# Patient Record
Sex: FEMALE | Race: WHITE
[De-identification: names, ages, dates, MRNs, and addresses within clinical notes are randomized per-mention and may not be internally consistent; named-entity substitution may affect disease eponyms.]

---

## 2017-05-12 ENCOUNTER — HOSPITAL ENCOUNTER (OUTPATIENT)
Dept: HOSPITAL 47 - RADMAMWWP | Age: 77
Discharge: HOME | End: 2017-05-12
Payer: MEDICARE

## 2017-05-12 DIAGNOSIS — R92.8: Primary | ICD-10-CM

## 2017-05-12 NOTE — MM
Reason for exam: follow-up at short interval from prior study.

Last mammogram was performed 6 months ago.



History:

Patient is postmenopausal.

Benign u/S right breast localization of the right breast, December 7, 2011.  9 

excisional biopsies of the left breast.  9 excisional biopsies of the right 

breast.

Took tamoxifen for 5 years beginning at age 62.



Physical Findings:

Nurse did not find any significant physical abnormalities on exam.



MG 3D Diag Mammo W/Cad LT

CC and MLO view(s) were taken of the left breast.

Prior study comparison: November 16, 2016, bilateral MG 3d screening mammo w/cad. 

July 6, 2015, bilateral MG screening mammo w CAD.

The breast tissue is heterogeneously dense. This may lower the sensitivity of 

mammography.

Finding: There are typically benign calcifications in the left breast. There is a 

chronic nodularity in the left breast.

No significant changes in finding since November 16, 2016 and July 6, 2015.



These results were verbally communicated with the patient and result sheet given 

to the patient on 5/12/17.





ASSESSMENT: Benign, BI-RAD 2



RECOMMENDATION:

Return to routine screening mammogram schedule for both breasts.

Back on schedule.

## 2018-01-12 ENCOUNTER — HOSPITAL ENCOUNTER (OUTPATIENT)
Dept: HOSPITAL 47 - RADMAMWWP | Age: 78
Discharge: HOME | End: 2018-01-12
Payer: MEDICARE

## 2018-01-12 DIAGNOSIS — Z12.31: Primary | ICD-10-CM

## 2018-01-12 PROCEDURE — 77063 BREAST TOMOSYNTHESIS BI: CPT

## 2018-01-12 PROCEDURE — 77067 SCR MAMMO BI INCL CAD: CPT

## 2018-01-15 NOTE — MM
Reason for exam: screening  (asymptomatic).

Last mammogram was performed 8 months ago.



History:

Patient is postmenopausal.

Benign u/S right breast localization of the right breast, December 7, 2011.  9 

excisional biopsies of the left breast.  9 excisional biopsies of the right 

breast.

Took tamoxifen for 5 years beginning at age 62.



Physical Findings:

A clinical breast exam by your physician is recommended on an annual basis and 

results should be correlated with mammographic findings.



MG 3D Screening Mammo W/Cad

Bilateral CC and MLO view(s) were taken.

Prior study comparison: May 12, 2017, left breast MG 3d diag mammo w/cad LT.  

November 16, 2016, bilateral MG 3d screening mammo w/cad.

The breast tissue is extremely dense which could obscure a lesion on mammography. 

Stable benign calcifications. There is no discrete abnormality.  No significant 

changes when compared with prior studies.





ASSESSMENT: Benign, BI-RAD 2



RECOMMENDATION:

Routine screening mammogram of both breasts in 1 year.

## 2020-09-25 ENCOUNTER — HOSPITAL ENCOUNTER (OUTPATIENT)
Dept: HOSPITAL 47 - WWCWWP | Age: 80
Discharge: HOME | End: 2020-09-25
Attending: SURGERY
Payer: MEDICARE

## 2020-09-25 VITALS
TEMPERATURE: 98.4 F | HEART RATE: 102 BPM | SYSTOLIC BLOOD PRESSURE: 130 MMHG | RESPIRATION RATE: 18 BRPM | DIASTOLIC BLOOD PRESSURE: 82 MMHG

## 2020-09-25 DIAGNOSIS — Z53.9: Primary | ICD-10-CM

## 2020-09-25 NOTE — P.GSHP
History of Present Illness


H&P Date: 20


Chief Complaint: fibrocystic breast disease





     Laly is a 80-year-old white female seen in consultation for Dr. Carolyne Mansfield who presents for breast evaluation.  Laly does not feel any lumps 

masses or nodules of concern in her breasts.  Her last mammogram was 2 years ago

.  She has had a long history of very fibrocystic breast disease.  She was 

actually on a antiestrogen trial approximately 20 years ago in which after 

starting the medication the lumpiness of her breast resolved.  She was on that 

medication for five years. She has had bilateral breast biopsies in the 

operating room all of which have been benign in the remote past.  She is not 

complaining of any trauma or infection in the breast.  





Caffeine:3 cups/day


Nicotine: Negative


Theophylline: daily





Family History:


uncle (maternal): stomach


maternal aunt: lymphoma


mother: skin cancer





Hormonal History:


menarche: 14


, breast fed: no, age at first birth: 26


menopause: hysterectomy at 36, left ovaries; no cancer


BCP: 5 years


hormones: none








Surgical history:


Bilateral breast biopsies


Hysterectomy


tonsil





Medical history:


Negative


leaky bladder





Social history:


Nicotine: Negative


Alcohol: Negative


Drugs: Negative














- Constitutional


Constitutional: Denies chills, Denies fever





- EENT


Eyes: denies blurred vision, denies pain


Ears: bilateral: decreased hearing (wears hearing aids), deny: tinnitus


Ears, nose, mouth and throat: Denies headache, Denies sore throat





- Breasts


Breasts: bilateral: as per HPI





- Cardiovascular


Cardiovascular: Denies chest pain, Denies shortness of breath





- Respiratory


Respiratory: Denies cough, Denies 7





- Gastrointestinal


Gastrointestinal: Denies abdominal pain, Denies diarrhea, Denies nausea, Denies 

vomiting





- Genitourinary (Female)


Genitourinary: Reports kidney stones, Denies dysuria, Denies hematuria





- Menstruation


Menstruation: Reports post hysterectomy





- Musculoskeletal


Comment: 





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





- Integumentary


Integumentary: Denies pruritus, Denies rash





- Neurological


Neurological: Denies numbness, Denies weakness





- Psychiatric


Psychiatric: Denies anxiety, Denies depression





- Endocrine


Endocrine: Denies fatigue, Denies weight change





- Hematologic/Lymphatic


Comment: 





none





- Allergic/Immunologic


Allergic/Immunologic: Reports seasonal allergies





Past Medical History


Past Medical History: Eye Disorder, Hearing Disorder / Deafness, Osteoarthritis 

(OA)


History of Any Multi-Drug Resistant Organisms: None Reported


Past Surgical History: Hysterectomy, Orthopedic Surgery


Additional Past Surgical History / Comment(s): BILATERAL CATARACTS


Past Anesthesia/Blood Transfusion Reactions: No Reported Reaction


Past Psychological History: No Psychological Hx Reported


Smoking Status: Never smoker


Past Alcohol Use History: None Reported


Past Drug Use History: None Reported





Medications and Allergies


                                Home Medications











 Medication  Instructions  Recorded  Confirmed  Type


 


Oxybutynin Chloride [Ditropan XL] 10 mg PO DAILY 14 History


 


Loratadine [Claritin] 5 mg PO DAILY 20 History


 


Multivitamin [Multivitamins Adult 1 each PO DAILY 20 History





Gummies]    








                                    Allergies











Allergy/AdvReac Type Severity Reaction Status Date / Time


 


adhesive Allergy  Unknown Verified 20 10:01


 


Benzodiazepines Allergy  Rash/Hives Verified 20 10:01


 


chlordiazepoxide HCl Allergy  Rash/Hives Verified 20 10:01





[From Librium]     


 


diazepam [From Valium] Allergy  Itching Verified 20 10:01


 


hydroxyzine Allergy  Nausea & Verified 20 10:01





   Vomiting  





   HEADACHE  


 


meperidine HCl [From Demerol] Allergy  Nausea & Verified 20 10:01





   Vomiting,HEADACHE  














Surgical - Exam


                                   Vital Signs











Temp Pulse Resp BP Pulse Ox


 


 98.4 F   102 H  18   130/82   93 L


 


 20 10:03  20 10:03  20 10:03  20 10:03  20 10:03














BMI 32.4





- General


no distress





- Eyes


normal ocular movement





- ENT


no hearing loss, no congestion





- Neck


trachea midline





- Respiratory


normal respiratory effort, clear to auscultation





- Cardiovascular


Rhythm: regular


Heart Sounds: normal: S1, S2





- Abdomen


Abdomen: soft, bowel sounds





- Integumentary





normal turgor





- Neurologic


no disoriented, no combative





- Musculoskeletal


normal gait, normal posture





- Psychiatric


oriented to time, oriented to person, oriented to place, speech is normal, 

memory intact





breast exam:


BRA: 36 DD


Inspection: Bilateral grade 2/3 ptosis, tatoo left breast


Palpation:


Right breast slightly larger than left breast, multiple positional exam 

fibrocystic changes, no dominant masses or nodules of concern


Right axilla: No adenopathy of concern


left breast: Multi-positional exam no dominant masses or nodules of concern 

fibrocystic changes


Left axilla: No adenopathy of concern








Assessment and Plan


Assessment: 





Impression:


1. fibrocystic breast disease


2.  Prior antiestrogen study


3.  Family history of cancer








Plan:


1.  Bilateral mammogram


2.  Follow-up after mammogram if this is benign yearly surveillance


3.  Patient to call if any questions or concerns





CC: Dr. Carolyne Mansfield





encounter 35 minutes, > 50% of time in planning and counselling

## 2020-09-30 ENCOUNTER — HOSPITAL ENCOUNTER (OUTPATIENT)
Dept: HOSPITAL 47 - RADMAMWWP | Age: 80
Discharge: HOME | End: 2020-09-30
Attending: SURGERY
Payer: MEDICARE

## 2020-09-30 DIAGNOSIS — Z12.31: Primary | ICD-10-CM

## 2020-09-30 PROCEDURE — 77063 BREAST TOMOSYNTHESIS BI: CPT

## 2020-09-30 PROCEDURE — 77067 SCR MAMMO BI INCL CAD: CPT

## 2020-10-01 NOTE — MM
Reason for exam: screening  (asymptomatic).

Last mammogram was performed 2 years and 9 months ago.



History:

Patient is postmenopausal.

Benign u/S right breast localization of the right breast, December 7, 2011.  9 

excisional biopsies of the left breast.  9 excisional biopsies of the right 

breast.

Took tamoxifen for 5 years beginning at age 62.



Physical Findings:

A clinical breast exam by your physician is recommended on an annual basis and 

results should be correlated with mammographic findings.



MG 3D Screening Mammo W/Cad

Bilateral CC and MLO view(s) were taken.

Prior study comparison: January 12, 2018, bilateral MG 3d screening mammo w/cad.  

May 12, 2017, left breast MG 3d diag mammo w/cad LT.

The breast tissue is extremely dense which could obscure a lesion on mammography. 

Stable benign calcifications. Stable post operative distortion left breast.  No 

significant changes when compared with prior studies.





ASSESSMENT: Benign, BI-RAD 2



RECOMMENDATION:

Routine screening mammogram of both breasts in 1 year.

## 2020-10-16 ENCOUNTER — HOSPITAL ENCOUNTER (OUTPATIENT)
Dept: HOSPITAL 47 - WWCWWP | Age: 80
Discharge: HOME | End: 2020-10-16
Attending: SURGERY
Payer: MEDICARE

## 2020-10-16 VITALS
HEART RATE: 83 BPM | RESPIRATION RATE: 16 BRPM | TEMPERATURE: 98.4 F | SYSTOLIC BLOOD PRESSURE: 134 MMHG | DIASTOLIC BLOOD PRESSURE: 79 MMHG

## 2020-10-16 DIAGNOSIS — Z53.9: Primary | ICD-10-CM

## 2020-10-16 NOTE — P.PN
Progress Note - Text


Progress Note Date: 10/16/20





Laly is here for results of [place] mamm[place] [address].  

This is benign BIRADS 2.  She will have repeat bilateral mammogram in 1 year 

with physician exam at that time.





cc: DR. Mansfield





encounter 5 minutes, > 50% of time on planning and counselling

## 2020-10-26 ENCOUNTER — HOSPITAL ENCOUNTER (OUTPATIENT)
Dept: HOSPITAL 47 - RADBDWWP | Age: 80
Discharge: HOME | End: 2020-10-26
Attending: FAMILY MEDICINE
Payer: MEDICARE

## 2020-10-26 DIAGNOSIS — Z78.0: ICD-10-CM

## 2020-10-26 DIAGNOSIS — M85.80: Primary | ICD-10-CM

## 2020-10-26 PROCEDURE — 77080 DXA BONE DENSITY AXIAL: CPT

## 2020-10-26 NOTE — BD
EXAMINATION TYPE: Axial Bone Density

 

DATE OF EXAM: 10/26/2020

 

COMPARISON: NONE

 

CLINICAL HISTORY: Postmenopausal screening

 

Height:  4 FT 9 1/2 IN

Weight:  153

 

FRAX RISK QUESTIONS:

Alcohol (3 or more units per day):  NO

Family History (Parent hip fracture):  NO

Glucocorticoids (More than 3mos):  NO

           (Ex: prednisone, prednisolone, methylprednisolone, dexamethasone, and hydrocortisone).    
     

History of Fracture in Adulthood: YES

Secondary Osteoporosis:

  1.  Type 1 Diabetes: NO

  2.  Hyperthyroidism: NO

  3.  Menopause before 45: YES

  4.  Malnutrition: NO

  5.  Chronic liver disease: NO

Rheumatoid Arthritis: NO

Current Tobacco Use: NO

 

RISK FACTORS 

HISTORY OF: 

Family History of Osteoporosis: NO

Active: YES

Diet low in dairy products/other sources of calcium:  NO

Postmenopausal woman: TOTAL HYST AGE 34

Take estrogen and/or progesterone medications: NO

Lost more than 2 inches in height since high school: YES

Frequent falls: NO

Poor Health: NO

 

MEDICATIONS: 

Additional Medications: BLADDER MEDS, OMEPRAZOLE, 

 

 

Additional History:

 

 

EXAM MEASUREMENTS: 

Bone mineral densitometry was performed using the [employer] System.

Bone mineral density as measured about the Lumbar spine is:  

----- L1-L4(G/cm2): 1.176

T Score Values are as follows:

----- L2: 0.1

----- L3: -0.4

----- L4: 0.4

----- L1-L4: 0.0

Bone mineral density has: INCREASED  9.6 % since study of: 2001

 

Bone mineral density about the R hip (g/cm2): 0.829

Bone mineral density about the L hip (g/cm2): 0.834

T Score values are as follows:

-----R Neck: -1.5

-----L Neck: -1.5

-----R Total: -0.6

-----L Total: -0.4

Bone mineral density has: DECREASED  -6.3 % since study of: 2001

 

 

IMPRESSION:

Osteopenia (T Score between -2.5 and -1).

 

There is slightly increased risk of fracture and the patient may be considered 

for treatment. 

 

Re-Screen 2-5 years.

 

NOTE:  T-SCORE=SD OF THE YOUNG ADULT MEAN.

## 2021-10-28 ENCOUNTER — HOSPITAL ENCOUNTER (OUTPATIENT)
Dept: HOSPITAL 47 - RADMAMWWP | Age: 81
Discharge: HOME | End: 2021-10-28
Attending: SURGERY
Payer: MEDICARE

## 2021-10-28 DIAGNOSIS — Z12.31: Primary | ICD-10-CM

## 2021-10-28 PROCEDURE — 77063 BREAST TOMOSYNTHESIS BI: CPT

## 2021-10-28 PROCEDURE — 77067 SCR MAMMO BI INCL CAD: CPT

## 2021-11-01 NOTE — MM
Reason for exam: screening  (asymptomatic).

Last mammogram was performed 1 year and 1 month ago.



History:

Patient is postmenopausal.

Benign u/S right breast localization of the right breast, December 7, 2011.  9 

excisional biopsies of the left breast.  9 excisional biopsies of the right 

breast.

Took hormonal contraceptives for 6 years.  Took tamoxifen for 5 years beginning at

age 62.



Physical Findings:

A clinical breast exam by your physician is recommended on an annual basis and 

results should be correlated with mammographic findings.



MG 3D Screening Mammo W/Cad

Bilateral CC and MLO view(s) were taken.

Prior study comparison: September 30, 2020, bilateral MG 3d screening mammo w/cad.

January 12, 2018, bilateral MG 3d screening mammo w/cad.  May 12, 2017, left 

breast MG 3d diag mammo w/cad LT.  November 16, 2016, bilateral MG 3d screening 

mammo w/cad.

The breast tissue is heterogeneously dense. This may lower the sensitivity of 

mammography.

Finding: There are increased number of typically benign grouped/clustered, fine 

calcifications in the upper outer quadrant, posterior position of the right 

breast. Previous mammotome biopsy in the left breast.

New finding since September 30, 2020, January 12, 2018, May 12, 2017, and 

November 16, 2016.





ASSESSMENT: Incomplete: need additional imaging evaluation, BI-RAD 0



RECOMMENDATION:

Special view mammogram of the right breast.



[facility] will attempt to contact patient to return for supplemental 

views.

## 2021-11-09 ENCOUNTER — HOSPITAL ENCOUNTER (OUTPATIENT)
Dept: HOSPITAL 47 - RADMAMWWP | Age: 81
Discharge: HOME | End: 2021-11-09
Attending: SURGERY
Payer: MEDICARE

## 2021-11-09 DIAGNOSIS — R92.8: Primary | ICD-10-CM

## 2021-11-09 PROCEDURE — 77061 BREAST TOMOSYNTHESIS UNI: CPT

## 2021-11-09 PROCEDURE — 77065 DX MAMMO INCL CAD UNI: CPT

## 2021-11-09 NOTE — MM
Reason for exam: additional evaluation requested from abnormal screening.

Last mammogram was performed less than 1 month ago.



History:

Patient is postmenopausal.

Benign u/S right breast localization of the right breast, December 7, 2011.  9 

excisional biopsies of the left breast.  9 excisional biopsies of the right 

breast.

Took hormonal contraceptives for 6 years.  Took tamoxifen for 5 years beginning at

age 62.



Physical Findings:

Nurse did not find any significant physical abnormalities on exam.



MG 3D Work Up W/Cad RT

CC with magnification, MLO with magnification, and ML view(s) were taken of the 

right breast.

Prior study comparison: October 28, 2021, bilateral MG 3d screening mammo w/cad.  

September 30, 2020, bilateral MG 3d screening mammo w/cad.

The breast tissue is heterogeneously dense. This may lower the sensitivity of 

mammography.

Finding: There is a 1.5-2.0cm coarse heterogeneous, grouped/clustered 

calcification in the upper outer quadrant, posterior position of the right breast 

confirmed on additional views.



These results were verbally communicated with the patient and result sheet given 

to the patient on 11/9/21.





ASSESSMENT: Suspicious, BI-RAD 4



RECOMMENDATION:

Stereotactic core biopsy of the right breast.



Called office with mammographic findings and has scheduled an appointment for the 

patient for 11/11/21 at 9:40 with Dr. Swenson.

Biopsy scheduled for 12/16/21 at 7:00.



PRELIMINARY REPORT CALLED AND FAXED TO DR. SWENSON ON 11/9/21.

## 2021-11-11 ENCOUNTER — HOSPITAL ENCOUNTER (OUTPATIENT)
Dept: HOSPITAL 47 - WWCWWP | Age: 81
Discharge: HOME | End: 2021-11-11
Attending: SURGERY
Payer: MEDICARE

## 2021-11-11 VITALS
TEMPERATURE: 98.2 F | SYSTOLIC BLOOD PRESSURE: 147 MMHG | HEART RATE: 82 BPM | DIASTOLIC BLOOD PRESSURE: 84 MMHG | RESPIRATION RATE: 18 BRPM

## 2021-11-11 DIAGNOSIS — Z53.9: Primary | ICD-10-CM

## 2021-11-11 NOTE — P.PN
Subjective


Progress Note Date: 21


Principal diagnosis: 





abnormal right breast mammogram


  Laly is a 81-year-old white female who presents for breast evaluation.  

Laly does not feel any lumps masses or nodules of concern in her breasts.   

She has had a long history of very fibrocystic breast disease.  She was actually

on a antiestrogen trial approximately 20 years ago in which after starting the 

medication the lumpiness of her breast resolved.  She was on that medication for

five years. She has had bilateral breast biopsies in the operating room all of 

which have been benign in the remote past.  She is not complaining of any trauma

or infection in the breast.  


She had a bilateral mammogram performed on 1020 additional views of the 

right breast were requested.  On additional views of the right breast the 

patient was noted to have calcifications in the upper outer quadrant and 

recommendation for stereotactic core biopsy.





Caffeine:3 cups/day


Nicotine: Negative


chocolate: daily





Family History:


uncle (maternal): stomach


maternal aunt: lymphoma


mother: skin cancer





Hormonal History:


menarche: 14


, breast fed: no, age at first birth: 26


menopause: hysterectomy at 36, left ovaries; no cancer


BCP: 5 years


hormones: none








Surgical history:


Bilateral breast biopsies


Hysterectomy


tonsil





Medical history:


leaky bladder





Social history:


Nicotine: Negative


Alcohol: Negative


Drugs: Negative














- Constitutional


Constitutional: Denies chills, Denies fever





- EENT


Eyes: denies blurred vision, denies pain


Ears: bilateral: decreased hearing (wears hearing aids), deny: tinnitus


Ears, nose, mouth and throat: Denies headache, Denies sore throat





- Breasts


Breasts: bilateral: as per HPI





- Cardiovascular


Cardiovascular: Denies chest pain, Denies shortness of breath





- Respiratory


Respiratory: Denies cough





- Gastrointestinal


Gastrointestinal: Denies abdominal pain, Denies diarrhea, Denies nausea, Denies 

vomiting





- Genitourinary (Female)


Genitourinary: Reports kidney stones, Denies dysuria, Denies hematuria





- Menstruation


Menstruation: Reports post hysterectomy





- Musculoskeletal


Comment: 





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





- Integumentary


Integumentary: Denies pruritus, Denies rash





- Neurological


Neurological: Denies numbness, Denies weakness





- Psychiatric


Psychiatric: Denies anxiety, Denies depression





- Endocrine


Endocrine: Denies fatigue, Denies weight change





- Hematologic/Lymphatic


Comment: 





none





- Allergic/Immunologic


Allergic/Immunologic: Reports seasonal allergies











Objective





- Vital Signs


Vital signs: 


                                   Vital Signs











Temp  98.2 F   21 09:42


 


Pulse  82   21 09:42


 


Resp  18   21 09:42


 


BP  147/84   21 09:42


 


Pulse Ox  96   21 09:42








                                 Intake & Output











 11/10/21 11/11/21 11/11/21





 18:59 06:59 18:59


 


Weight   68.039 kg














- Constitutional


General appearance: Present: cooperative





- EENT


Eyes: Present: EOMI


ENT: Present: hearing grossly normal





- Neck


Neck: Present: normal ROM





- Respiratory


Respiratory: bilateral: CTA





- Cardiovascular


Rhythm: regular


Heart sounds: normal: S1, S2





- Gastrointestinal


General gastrointestinal: Present: soft





- Integumentary


Integumentary: Present: normal turgor





- Musculoskeletal


Musculoskeletal: Present: gait normal





- Psychiatric


Psychiatric: Present: A&O x's 3, appropriate affect, intact judgment & insight





- Additional findings


Additional findings: 





Breast Exam:


BRA: 48DD


 inspection: bilateral grade 3 ptosis


Palpation:


Right breast: Multi-positional exam fibrocystic changes no dominant masses or 

nodules of concern, well-healed scar from prior surgery


Right axilla: No adenopathy of concern


Left breast: Multiple positional exam fibrocystic changes no dominant masses or 

nodules of concern


Left axilla: No adenopathy of concern








Assessment and Plan


Assessment: 





Impression:


1.  Abnormal right breast mammogram


2.  Fibrocystic breast changes





Plan:


1.  Stereotactic core biopsy of the right breast


2.  Follow-up after stereotactic core biopsy of the right breast





CC: Dr. Carolyne Mansfield

## 2021-12-16 ENCOUNTER — HOSPITAL ENCOUNTER (OUTPATIENT)
Dept: HOSPITAL 47 - RADMAMWWP | Age: 81
End: 2021-12-16
Attending: SURGERY
Payer: MEDICARE

## 2021-12-16 VITALS
HEART RATE: 85 BPM | RESPIRATION RATE: 16 BRPM | SYSTOLIC BLOOD PRESSURE: 135 MMHG | TEMPERATURE: 98.3 F | DIASTOLIC BLOOD PRESSURE: 83 MMHG

## 2021-12-16 DIAGNOSIS — R92.0: Primary | ICD-10-CM

## 2021-12-16 PROCEDURE — 88305 TISSUE EXAM BY PATHOLOGIST: CPT

## 2021-12-16 PROCEDURE — 19081 BX BREAST 1ST LESION STRTCTC: CPT

## 2021-12-16 NOTE — MM
Laly is a 81-year-old white female who on a routine mammogram was noted to 
have microcalcifications of concern in the right breast in the upper outer 
quadrant.  A stereotactic core biopsy was recommended.  Risks and benefits of 
the procedure were discussed with the patient.  Alternatives such as watchful 
waiting or resection the operating room were discussed but not recommended.  The
patient agreed to stereotactic core biopsy.



The patient was taken to stereotactic core biopsy room.  CC from above approach 
was utilized to obtain a  film.  The calcifications of concern were 
identified.  A stereo pair was obtained.  The calcifications were targeted.  The
breast was prepped using Betadine.  20 mL of 1% lidocaine were used to 
anesthetize the area of concern.  A 9-gauge vacuum-assisted core rotating biopsy
needle was driven to the correct coordinates.  A prefire film was obtained.  The
needle was noted to be in the correct location.  The needle was fired.  A post 
fire film was obtained.  The needle was again noted to be in the correct 
location.  13 samples were obtained.  The area was lavaged.  Radiograph of the 
sample revealed the calcifications of concern had been obtained.  A secure 
marked Top [place] clip was placed.  This was noted to be in the correct location.  
The specimen was sent to pathology.  A postprocedure mammogram revealed the clip
to have migrated approximately 3 cm inferior to the biopsy site.  The 
calcifications of concern however had been adequately sampled and were not 
present in the postprocedure mammogram.  The patient will follow-up with Dr. Heart.  The patient tolerated the procedure in stable condition.


DAFNE
[place]

## 2021-12-16 NOTE — P.PCN
Date of Procedure: 12/16/21


Preoperative Diagnosis: 


microcalcifications of concern right breast upper outer quadrant


Postoperative Diagnosis: 


Same


Procedure(s) Performed: 


Stereotactic core biopsy right breast


Anesthesia: local


Surgeon: Fiona Swenson


Pathology: other (Breast tissue/radiograph reveals microcalcifications of 

concern)


Condition: stable


Disposition: same day


Indications for Procedure: 


Microcalcifications of concern right breast upper outer quadrant


Operative Findings: 


Breast tissue with microcalcifications noted in radiograph


Description of Procedure: 


Laly is a 81-year-old white female who on a routine mammogram was noted to 

have microcalcifications of concern in the right breast in the upper outer 

quadrant.  A stereotactic core biopsy was recommended.  Risks and benefits of 

the procedure were discussed with the patient.  Alternatives such as watchful 

waiting or resection the operating room were discussed but not recommended.  The

patient agreed to stereotactic core biopsy.





The patient was taken to stereotactic core biopsy room.  CC from above approach 

was utilized to obtain a  film.  The calcifications of concern were 

identified.  Stereo pair was obtained.  The calcifications were targeted.  The 

breast was prepped using Betadine.  20 mL of 1% lidocaine were used to 

anesthetize the area of concern.  A 9-gauge vacuum-assisted core rotating biopsy

needle was driven to the correct coordinates.  A prefire film was obtained.  The

needle was noted to be in the correct location.  The needle was fired.  A post 

fire film was obtained.  The needle was again noted to be in the correct 

location.  13 samples were obtained.  The area was lavaged.  Radiograph of the 

sample revealed the calcifications of concern had been obtained.  A secure 

marked [place] clip was placed.  This was noted to be in the correct location.  

The specimen was sent to pathology.  The patient will follow-up with Dr. Heart. 

The patient tolerated the procedure in stable condition.

## 2022-01-06 ENCOUNTER — HOSPITAL ENCOUNTER (OUTPATIENT)
Dept: HOSPITAL 47 - WWCWWP | Age: 82
Discharge: HOME | End: 2022-01-06
Attending: SURGERY
Payer: MEDICARE

## 2022-01-06 VITALS
RESPIRATION RATE: 18 BRPM | HEART RATE: 87 BPM | TEMPERATURE: 98.3 F | SYSTOLIC BLOOD PRESSURE: 138 MMHG | DIASTOLIC BLOOD PRESSURE: 72 MMHG

## 2022-01-06 DIAGNOSIS — Z53.9: Primary | ICD-10-CM

## 2022-01-06 NOTE — P.PN
Subjective


Progress Note Date: 01/06/22


Principal diagnosis: 





 fiborcystic breast changes


Laly is a 81 year old white female status post stero biopsy of the right 

breast on 12-20-21.  The pathology was benign/concordant.  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

cations.  She tolerated the procedure without difficulty.








Objective





- Vital Signs


Vital signs: 


                                   Vital Signs











Temp  98.3 F   01/06/22 11:52


 


Pulse  87   01/06/22 11:52


 


Resp  18   01/06/22 11:52


 


BP  138/72   01/06/22 11:52


 


Pulse Ox  95   01/06/22 11:52








                                 Intake & Output











 01/05/22 01/06/22 01/06/22





 18:59 06:59 18:59


 


Weight   68.946 kg














- Exam





BMI 30.7





- Constitutional


General appearance: Present: cooperative





- EENT


Eyes: Present: EOMI


ENT: Present: hearing grossly normal





- Neck


Neck: Present: normal ROM





- Respiratory


Respiratory: bilateral: CTA





- Cardiovascular


Heart sounds: normal: S1, S2





- Integumentary


Integumentary Comment(s): 





biopsy site clean and dry right breast/ ?  Small hematoma at the biopsy site





Assessment and Plan


Assessment: 





Impression/Plan:


Patient stable status post are tachycardic biopsy right breast pathology benign 

concordant


Questionable small hematoma at biopsy site


Repeat right breast mammogram in 6 months with physician exam at that time


If patient notes anything of concern she will call sooner





CC: Dr. Mansfield

## 2022-06-20 ENCOUNTER — HOSPITAL ENCOUNTER (OUTPATIENT)
Dept: HOSPITAL 47 - RADMAMWWP | Age: 82
Discharge: HOME | End: 2022-06-20
Attending: SURGERY
Payer: MEDICARE

## 2022-06-20 DIAGNOSIS — R92.8: Primary | ICD-10-CM

## 2022-06-20 PROCEDURE — 77061 BREAST TOMOSYNTHESIS UNI: CPT

## 2022-06-20 PROCEDURE — 77065 DX MAMMO INCL CAD UNI: CPT

## 2022-06-20 NOTE — MM
Reason for Exam: Follow-up at short interval from prior study. 

Last screening mammogram was performed 8 month(s) ago.





Patient History: 

Menarche at age 14. First Full-Term Pregnancy at age 25. Left ovary removed at age 33. Right ovary

removed at age 33. Hysterectomy at age 33. Postmenopausal. Patient used Hormonal Contraceptives for

6 years. Tamoxifen for 5 years from age 62 until age 67. Excisional Biopsy on the Right side.

Excisional Biopsy on the Right side. Excisional Biopsy on the Right side. Excisional Biopsy on the

Right side. Excisional Biopsy on the Right side. Excisional Biopsy on the Right side. Excisional

Biopsy on the Right side. Excisional Biopsy on the Right side. Excisional Biopsy on the Right side.

Excisional Biopsy on the Left side. Excisional Biopsy on the Left side. Excisional Biopsy on the

Left side. Excisional Biopsy on the Left side. Excisional Biopsy on the Left side. Excisional Biopsy

on the Left side. Excisional Biopsy on the Left side. Excisional Biopsy on the Left side. Excisional

Biopsy on the Left side. 12/16/2021, Benign Core Biopsy on the right side. 12/7/2011, Benign

Excisional Biopsy on the right side. 





Risk Values: 

Nan 5 year model risk: 2.2%.

NCI Lifetime model risk: 3.4%.





Prior Study Comparison: 

9/30/2020 Bilateral Screening Mammogram, [facility]. 10/28/2021 Bilateral Screening Mammogram, [facility].

11/9/2021 Right Diagnostic Mammogram, [facility]. 





Tissue Density: 

Right: The breast tissue is heterogeneously dense. This may lower the sensitivity of mammography.





Findings: 

Analyzed By CAD. 

There are multiple benign appearing calcifications present. Coronary markers are present. Some

postbiopsy distortion remains present. No significant interval change is evident. 





Overall Assessment: Benign, BI-RAD 2





Management: 

Screening Mammogram of both breasts in 5 months.

A clinical breast exam by your physician is recommended on an annual basis and results should be

correlated with mammographic findings.  This exam should not preclude additional follow-up of

suspicious palpable abnormalities.  Results were given to the patient verbally at the time of exam.



Electronically signed and approved by: Chuy Rodriguez D.O. Radiologis

## 2022-07-22 ENCOUNTER — HOSPITAL ENCOUNTER (OUTPATIENT)
Dept: HOSPITAL 47 - WWCWWP | Age: 82
End: 2022-07-22
Attending: SURGERY
Payer: MEDICARE

## 2022-07-22 VITALS
TEMPERATURE: 98.9 F | SYSTOLIC BLOOD PRESSURE: 172 MMHG | RESPIRATION RATE: 17 BRPM | HEART RATE: 89 BPM | DIASTOLIC BLOOD PRESSURE: 76 MMHG

## 2022-07-22 DIAGNOSIS — Z88.8: ICD-10-CM

## 2022-07-22 DIAGNOSIS — Z91.048: ICD-10-CM

## 2022-07-22 DIAGNOSIS — N60.11: Primary | ICD-10-CM

## 2022-07-22 DIAGNOSIS — Z88.5: ICD-10-CM

## 2022-07-22 DIAGNOSIS — N60.12: ICD-10-CM

## 2022-07-22 NOTE — P.PN
Subjective


Progress Note Date: 22


Principal diagnosis: 





fibrocystic breast 


abnormal right breast mammogram


  Laly is an 82-year-old white female who presents for breast evaluation.  

Laly does not feel any lumps masses or nodules of concern in her breasts.   

She has had a long history of very fibrocystic breast disease.  She was actually

on a antiestrogen trial approximately 20 years ago in which after starting the 

medication the lumpiness of her breast resolved.  She was on that medication for

five years. She has had bilateral breast biopsies in the operating room all of 

which have been benign in the remote past.  She is not complaining of any trauma

or infection in the breast.  


She had a bilateral mammogram performed on 1020 additional views of the 

right breast were requested.  On additional views of the right breast the 

patient was noted to have calcifications in the upper outer quadrant and 

recommendation for stereotactic core biopsy.





Stero biopsy was done on 21 which was concordant benign.  She had a repeat

right breast mammogram on 22 which was benign BIRAD 2.  Does not feel any 

new lumps masses or nodules of concern in either breast.  She has no complaints 

related to her breast.








Caffeine:3 cups/day


Nicotine: Negative


chocolate: daily





Family History:


uncle (maternal): stomach


maternal aunt: lymphoma


mother: skin cancer





Hormonal History:


menarche: 14


, breast fed: no, age at first birth: 26


menopause: hysterectomy at 36, left ovaries; no cancer


BCP: 5 years


hormones: none








Surgical history:


Bilateral breast biopsies


Hysterectomy


tonsil


stero biopsy right breast





Medical history:


leaky bladder





Social history:


Nicotine: Negative


Alcohol: Negative


Drugs: Negative














- Constitutional


Constitutional: Denies chills, Denies fever





- EENT


Eyes: denies blurred vision, denies pain


Ears: bilateral: decreased hearing (wears hearing aids), deny: tinnitus


Ears, nose, mouth and throat: Denies headache, Denies sore throat





- Breasts


Breasts: bilateral: as per HPI





- Cardiovascular


Cardiovascular: Denies chest pain, Denies shortness of breath





- Respiratory


Respiratory: Denies cough





- Gastrointestinal


Gastrointestinal: Denies abdominal pain, Denies diarrhea, Denies nausea, Denies 

vomiting





- Genitourinary (Female)


Genitourinary: Reports kidney stones, Denies dysuria, Denies hematuria





- Menstruation


Menstruation: Reports post hysterectomy





- Musculoskeletal


Comment: 





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





- Integumentary


Integumentary: Denies pruritus, Denies rash





- Neurological


Neurological: Denies numbness, Denies weakness





- Psychiatric


Psychiatric: Denies anxiety, Denies depression





- Endocrine


Endocrine: Denies fatigue, Denies weight change





- Hematologic/Lymphatic


Comment: 





none





- Allergic/Immunologic


Allergic/Immunologic: Reports seasonal allergies

















Objective





- Constitutional


General appearance: Present: cooperative





- EENT


Eyes: Present: EOMI





- Neck


Neck: Present: normal ROM





- Respiratory


Respiratory: bilateral: CTA





- Cardiovascular


Rhythm: regular


Heart sounds: normal: S1, S2





- Integumentary


Integumentary: Present: normal turgor





- Musculoskeletal


Musculoskeletal: Present: gait normal





- Psychiatric


Psychiatric: Present: A&O x's 3, appropriate affect, intact judgment & insight





- Additional findings


Additional findings: 





Breast Exam:


BRA: 36DD


inspection: Bilateral grade 2/3 ptosis, well-healed scars from prior biopsy 

bilateral breast


palpation:


right breast: Multiple positional exam fibrocystic changes no dominant masses or

nodules of concern


Right axilla: No adenopathy of concern


Left breast: Multiple positional exam fibrocystic changes no dominant masses or 

nodules of concern


Left axilla: No adenopathy of concern





Assessment and Plan


Assessment: 





Impression:


Fibrocystic breast





Plan:


Bilateral mammogram 5 months with physician exam at that time


Patient to follow up sooner any questions or concerns





CC: Dr. Echevarria